# Patient Record
Sex: MALE | URBAN - METROPOLITAN AREA
[De-identification: names, ages, dates, MRNs, and addresses within clinical notes are randomized per-mention and may not be internally consistent; named-entity substitution may affect disease eponyms.]

---
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
## 2023-03-18 NOTE — PROCEDURE: MEDICATION COUNSELING
Called and gave her Vitamin D def  E55.9; and added lipid panel- diag- hypercholesterol. Called and gave orders over the phone per standing order Dr Ashlie Gutierrez. Patient notified. Topical Sulfur Applications Counseling: Topical Sulfur Counseling: Patient counseled that this medication may cause skin irritation or allergic reactions.  In the event of skin irritation, the patient was advised to reduce the amount of the drug applied or use it less frequently.   The patient verbalized understanding of the proper use and possible adverse effects of topical sulfur application.  All of the patient's questions and concerns were addressed.

## 2023-08-10 NOTE — PROCEDURE: MEDICATION COUNSELING
[place] [address]
Phone:  [phone]                     ELECTROCARDIOGRAM REPORT      
_______________________________________________________________________________
 
Name:       TORRIE PLUMMER                Room:                      [facility]#:  [id number]      Account #:      [id number]  
Admission:  18     Attend Phys:                         
Discharge:               Date of Birth:  [birth year]  
         Report #: [id number]
    [id number]
_______________________________________________________________________________
THIS REPORT FOR:  //name//                      
 
                         [facility] ED
                                       
Test Date:    2018               Test Time:    09:20:51
Pat Name:     TORRIE PLUMMER            Department:   
Patient ID:   [id number]            Room:          
Gender:       F                        Technician:   [facility]
:          1968               Requested By: Lauro Santiago
Order Number: [id number] MD:   Michael Barrios
                                 Measurements
Intervals                              Axis          
Rate:         71                       P:            61
WA:           145                      QRS:          3
QRSD:         84                       T:            6
QT:           396                                    
QTc:          431                                    
                           Interpretive Statements
Sinus rhythm
possible inferior scar
No previous ECG available for comparison
 
Electronically Signed On 2018 11:46:45 CDT by Michael Barrios
[email or web address]
 
 
 
 
 
 
 
 
 
 
 
 
 
 
 
 
 
 
 
  <ELECTRONICALLY SIGNED>
                                           By: Michael Barrios MD, [facility]     
  18     1146
D: 18 0920   _____________________________________
T: 18 0920   Michael Barrios MD, FACC       /EPI Cimzia Counseling:  I discussed with the patient the risks of Cimzia including but not limited to immunosuppression, allergic reactions and infections.  The patient understands that monitoring is required including a PPD at baseline and must alert us or the primary physician if symptoms of infection or other concerning signs are noted. face

## 2025-01-25 NOTE — PROCEDURE: MEDICATION COUNSELING
Name band; Azathioprine Pregnancy And Lactation Text: This medication is Pregnancy Category D and isn't considered safe during pregnancy. It is unknown if this medication is excreted in breast milk.
